# Patient Record
Sex: FEMALE | Race: WHITE | NOT HISPANIC OR LATINO | URBAN - NONMETROPOLITAN AREA
[De-identification: names, ages, dates, MRNs, and addresses within clinical notes are randomized per-mention and may not be internally consistent; named-entity substitution may affect disease eponyms.]

---

## 2019-08-15 ENCOUNTER — IMPORTED ENCOUNTER (OUTPATIENT)
Dept: URBAN - NONMETROPOLITAN AREA CLINIC 1 | Facility: CLINIC | Age: 44
End: 2019-08-15

## 2019-08-15 PROBLEM — H52.13: Noted: 2019-08-15

## 2019-08-15 PROBLEM — H52.4: Noted: 2019-08-15

## 2019-08-15 PROCEDURE — 92015 DETERMINE REFRACTIVE STATE: CPT

## 2019-08-15 PROCEDURE — 92004 COMPRE OPH EXAM NEW PT 1/>: CPT

## 2019-08-15 NOTE — PATIENT DISCUSSION
Myopia/Presbyopia OU - New glasses Rx given today - Continue to monitor; 's Notes: MR 8/15/19DFE 8/15/19

## 2020-01-22 NOTE — PATIENT DISCUSSION
Recommend treating options of SLT vs Drops. R&B reviewed of each. Pt chooses SLT, will treat 180 degrees. Plan OS then IOP with Dr. Sharon Johnson in 3-4 weeks. +/- SLT OD.

## 2020-01-22 NOTE — PROCEDURE NOTE: CLINICAL
PROCEDURE NOTE: SLT #1 OS. Anesthesia: Topical. Prep: Alphagan 0.15%. Prior to treatment, risks/benefits/alternatives discussed including infection, loss of vision, hemorrhage, cataract, glaucoma, retinal tears or detachment. Lens:  SLT laser lens with goniosol. Power: 0.6mJ. Total applications: 81. Application 791 degrees. Patient tolerated procedure well. There were no complications. Post-op instructions given. Post-op IOP = 18 mmHg. Uli Wilkins

## 2020-04-13 NOTE — PROCEDURE NOTE: CLINICAL
PROCEDURE NOTE: SLT OD. Diagnosis: POAG, Mild. Anesthesia: Topical. Prep: Alphagan 0.15%. Prior to treatment, risks/benefits/alternatives discussed including infection, loss of vision, hemorrhage, cataract, glaucoma, retinal tears or detachment. Lens:  SLT laser lens with goniosol. Power: 1.0mJ. Total applications: 98. Application 402 degrees. Patient tolerated procedure well. There were no complications. Post-op instructions given. Post-op IOP = 14 mmHg. Elbert Arana

## 2022-04-09 ASSESSMENT — TONOMETRY
OS_IOP_MMHG: 19
OD_IOP_MMHG: 19

## 2022-04-09 ASSESSMENT — VISUAL ACUITY
OS_SC: 20/25
OS_CC: J5-
OD_CC: J7
OD_SC: 20/25